# Patient Record
Sex: MALE | Race: WHITE | Employment: FULL TIME | URBAN - METROPOLITAN AREA
[De-identification: names, ages, dates, MRNs, and addresses within clinical notes are randomized per-mention and may not be internally consistent; named-entity substitution may affect disease eponyms.]

---

## 2018-07-25 ENCOUNTER — HOSPITAL ENCOUNTER (EMERGENCY)
Age: 28
Discharge: ANOTHER HOSPITAL | End: 2018-07-25
Attending: EMERGENCY MEDICINE
Payer: OTHER MISCELLANEOUS

## 2018-07-25 VITALS
HEART RATE: 68 BPM | TEMPERATURE: 97.9 F | OXYGEN SATURATION: 99 % | WEIGHT: 210 LBS | HEIGHT: 77 IN | SYSTOLIC BLOOD PRESSURE: 140 MMHG | DIASTOLIC BLOOD PRESSURE: 84 MMHG | RESPIRATION RATE: 14 BRPM | BODY MASS INDEX: 24.79 KG/M2

## 2018-07-25 DIAGNOSIS — T23.321A FULL THICKNESS BURN OF FINGER OF RIGHT HAND, INITIAL ENCOUNTER: Primary | ICD-10-CM

## 2018-07-25 PROCEDURE — 99283 EMERGENCY DEPT VISIT LOW MDM: CPT

## 2018-07-25 PROCEDURE — 90715 TDAP VACCINE 7 YRS/> IM: CPT | Performed by: EMERGENCY MEDICINE

## 2018-07-25 PROCEDURE — 90471 IMMUNIZATION ADMIN: CPT

## 2018-07-25 PROCEDURE — 74011250636 HC RX REV CODE- 250/636: Performed by: EMERGENCY MEDICINE

## 2018-07-25 RX ADMIN — TETANUS TOXOID, REDUCED DIPHTHERIA TOXOID AND ACELLULAR PERTUSSIS VACCINE, ADSORBED 0.5 ML: 5; 2.5; 8; 8; 2.5 SUSPENSION INTRAMUSCULAR at 10:31

## 2018-07-25 NOTE — ED PROVIDER NOTES
HPI Comments: 34 y.o. male with past medical history significant for vertigo, S/P tonsillectomy, and S/P tympanostomy who presents to the ED with chief complaint of burn. Pt reports he was using a push  this morning about an hour ago when his right hand got caught between the exhaust of the mower and a shelf and he received burns to his fingers. Pt now reports burns to the dorsal aspect of his right 2nd, 3rd, and 4th fingers with associated pain from the exhaust of the mower. Pt states he applied first aid cream and put a bandage over the area and came to the ED for evaluation. Pt states his last tetanus is unknown. There are no other acute medical complaints voiced at this time. Social Hx: Former smoker. Denies EtOH use. PCP: No primary care provider on file. Note written by Delfina Sol, as dictated by Josh Guy MD 10:05 AM     The history is provided by the patient and a relative. Past Medical History:   Diagnosis Date    Vertigo        Past Surgical History:   Procedure Laterality Date    HX TONSILLECTOMY      HX TYMPANOSTOMY           History reviewed. No pertinent family history. Social History     Social History    Marital status: SINGLE     Spouse name: N/A    Number of children: N/A    Years of education: N/A     Occupational History    Not on file. Social History Main Topics    Smoking status: Former Smoker    Smokeless tobacco: Never Used      Comment: quit 5/2018    Alcohol use No    Drug use: Not on file    Sexual activity: Not on file     Other Topics Concern    Not on file     Social History Narrative    No narrative on file         ALLERGIES: Review of patient's allergies indicates no known allergies. Review of Systems   Constitutional: Negative for chills and fever. HENT: Negative for ear pain and sore throat. Eyes: Negative for photophobia and pain. Respiratory: Negative for chest tightness and shortness of breath. Cardiovascular: Negative for chest pain and leg swelling. Gastrointestinal: Negative for abdominal pain, nausea and vomiting. Genitourinary: Negative for dysuria and flank pain. Musculoskeletal: Negative for back pain and neck pain. Skin: Negative for rash. +burns to dorsal aspect of right 2nd, 3rd, and 4th fingers with associated pain   Neurological: Negative for dizziness, light-headedness and headaches. All other systems reviewed and are negative. Vitals:    07/25/18 0953   BP: 147/89   Pulse: 65   Resp: 16   Temp: 98.8 °F (37.1 °C)   SpO2: 99%   Weight: 95.3 kg (210 lb)   Height: 6' 5\" (1.956 m)            Physical Exam   Musculoskeletal:        Right hand: He exhibits decreased range of motion. He exhibits normal capillary refill. Hands: The burns are white, non-tender and non-blanching  Decreased ROM of the DIP joints on fingers 2,3,4   Skin:   Full thickness burns to right hand   Nursing note and vitals reviewed.        MDM  Number of Diagnoses or Management Options  Full thickness burn of finger of right hand, initial encounter:   Diagnosis management comments: Full thickness burns to fingers - right hand - [facility] unit at [facility]  Give tetanus, may transfer by private car        ED Course       Procedures

## 2018-07-25 NOTE — ED TRIAGE NOTES
Pt has burn to 2nd,3rd, and 4th right fingers from  exhaust approx 1 hour ago. Sensation present to fingers.

## 2021-12-26 ENCOUNTER — HOSPITAL ENCOUNTER (EMERGENCY)
Age: 31
Discharge: HOME | End: 2021-12-26
Attending: EMERGENCY MEDICINE
Payer: COMMERCIAL

## 2021-12-26 ENCOUNTER — APPOINTMENT (OUTPATIENT)
Dept: GENERAL RADIOLOGY | Age: 31
End: 2021-12-26
Attending: EMERGENCY MEDICINE
Payer: COMMERCIAL

## 2021-12-26 VITALS
DIASTOLIC BLOOD PRESSURE: 75 MMHG | OXYGEN SATURATION: 96 % | WEIGHT: 225 LBS | RESPIRATION RATE: 16 BRPM | HEART RATE: 76 BPM | BODY MASS INDEX: 26.57 KG/M2 | SYSTOLIC BLOOD PRESSURE: 129 MMHG | TEMPERATURE: 98.6 F | HEIGHT: 77 IN

## 2021-12-26 DIAGNOSIS — S63.259A DISLOCATION OF FINGER, INITIAL ENCOUNTER: ICD-10-CM

## 2021-12-26 DIAGNOSIS — S62.657A NONDISPLACED FRACTURE OF MIDDLE PHALANX OF LEFT LITTLE FINGER, INITIAL ENCOUNTER FOR CLOSED FRACTURE: Primary | ICD-10-CM

## 2021-12-26 PROCEDURE — 74011250637 HC RX REV CODE- 250/637: Performed by: EMERGENCY MEDICINE

## 2021-12-26 PROCEDURE — 75810000301 HC ER LEVEL 1 CLOSED TREATMNT FRACTURE/DISLOCATION

## 2021-12-26 PROCEDURE — 99283 EMERGENCY DEPT VISIT LOW MDM: CPT

## 2021-12-26 PROCEDURE — 73140 X-RAY EXAM OF FINGER(S): CPT

## 2021-12-26 RX ORDER — HYDROCODONE BITARTRATE AND ACETAMINOPHEN 5; 325 MG/1; MG/1
1 TABLET ORAL
Status: COMPLETED | OUTPATIENT
Start: 2021-12-26 | End: 2021-12-26

## 2021-12-26 RX ADMIN — HYDROCODONE BITARTRATE AND ACETAMINOPHEN 1 TABLET: 5; 325 TABLET ORAL at 13:24

## 2021-12-26 NOTE — ED PROVIDER NOTES
Please note that this dictation was completed with [employer], the computer voice recognition software.  Quite often unanticipated grammatical, syntax, homophones, and other interpretive errors are inadvertently transcribed by the computer software.  Please disregard these errors.  Please excuse any errors that have escaped final proofreading. Patient is a 33-year-old male presenting to ED for evaluation of left pinky finger pain. He states that just prior to arrival he was playing flag football and fell onto his hand. He denies blow to head or loss of consciousness. Does not take blood thinners. Denies any additional medical complaints at this time. Past Medical History:   Diagnosis Date    Vertigo        Past Surgical History:   Procedure Laterality Date    HX TONSILLECTOMY      HX TYMPANOSTOMY           No family history on file. Social History     Socioeconomic History    Marital status: SINGLE     Spouse name: Not on file    Number of children: Not on file    Years of education: Not on file    Highest education level: Not on file   Occupational History    Not on file   Tobacco Use    Smoking status: Former Smoker    Smokeless tobacco: Never Used    Tobacco comment: quit 5/2018   Substance and Sexual Activity    Alcohol use: No    Drug use: Not on file    Sexual activity: Not on file   Other Topics Concern    Not on file   Social History Narrative    Not on file     Social Determinants of Health     Financial Resource Strain:     Difficulty of Paying Living Expenses: Not on file   Food Insecurity:     Worried About [address] in the Last Year: Not on file    Karolina of Food in the Last Year: Not on file   Transportation Needs:     Lack of Transportation (Medical): Not on file    Lack of Transportation (Non-Medical):  Not on file   Physical Activity:     Days of Exercise per Week: Not on file    Minutes of Exercise per Session: Not on file   Stress:     Feeling of Stress : Not on file   Social Connections:     Frequency of Communication with Friends and Family: Not on file    Frequency of Social Gatherings with Friends and Family: Not on file    Attends Druze Services: Not on file    Active Member of Clubs or Organizations: Not on file    Attends Club or Organization Meetings: Not on file    Marital Status: Not on file   Intimate Partner Violence:     Fear of Current or Ex-Partner: Not on file    Emotionally Abused: Not on file    Physically Abused: Not on file    Sexually Abused: Not on file   Housing Stability:     Unable to Pay for Housing in the Last Year: Not on file    Number of [place] in the Last Year: Not on file    Unstable Housing in the Last Year: Not on file         ALLERGIES: Patient has no known allergies. Review of Systems   Constitutional: Negative for chills and fever. HENT: Negative for ear pain and sore throat. Eyes: Negative for visual disturbance. Respiratory: Negative for cough and shortness of breath. Cardiovascular: Negative for chest pain. Gastrointestinal: Negative for abdominal pain. Genitourinary: Negative for flank pain. Musculoskeletal: Positive for arthralgias. Negative for back pain. Skin: Negative for color change. Neurological: Negative for dizziness and headaches. Psychiatric/Behavioral: Negative for confusion. Vitals:    12/26/21 1217   BP: 129/75   Pulse: 76   Resp: 16   Temp: 98.6 °F (37 °C)   SpO2: 96%   Weight: 102.1 kg (225 lb)   Height: 6' 5\" (1.956 m)            Physical Exam  Vitals and nursing note reviewed. Constitutional:       General: He is not in acute distress. Appearance: Normal appearance. He is not ill-appearing. HENT:      Head: Normocephalic and atraumatic. Jaw: There is normal jaw occlusion. Eyes:      General: Vision grossly intact. Extraocular Movements: Extraocular movements intact.       Conjunctiva/sclera: Conjunctivae normal.   Neck: Trachea: Phonation normal.   Cardiovascular:      Rate and Rhythm: Normal rate and regular rhythm. Heart sounds: Normal heart sounds. Pulmonary:      Effort: Pulmonary effort is normal.      Breath sounds: Normal breath sounds and air entry. Abdominal:      Palpations: Abdomen is soft. Tenderness: There is no abdominal tenderness. Musculoskeletal:      Left wrist: Normal.      Left hand: Deformity (Visible deformity to left fifth finger with decreased range of motion, no signs of open fracture. Distal gross sensation normal, normal cap refill.) and bony tenderness present. Decreased range of motion. Cervical back: Normal range of motion. Skin:     General: Skin is warm and dry. Neurological:      General: No focal deficit present. Mental Status: He is alert and oriented to person, place, and time. Psychiatric:         Attention and Perception: Attention normal.         Mood and Affect: Mood normal.         Speech: Speech normal.          MDM  Number of Diagnoses or Management Options  Dislocation of finger, initial encounter  Nondisplaced fracture of middle phalanx of left little finger, initial encounter for closed fracture  Diagnosis management comments: Patient is alert, afebrile, vitals stable. Presents with a deformity to the left fifth finger after a fall. No head injury. X-ray showing PIP dislocation which was reduced, repeat x-ray showing successful reduction and small avulsion fracture at the base of the middle phalanx. No clinical signs of neurovascular damage. Finger splint applied, patient to follow-up with Ortho with instructions to keep the finger splint on for the next 4 to 6 weeks. ED Course as of 12/26/21 1334   Sun Dec 26, 2021   1332 XR 5TH FINGER LT MIN 2 V    IMPRESSION  Satisfactory alignment following reduction of the dislocation. Tiny  avulsion fracture again noted base of the middle phalanx.  [EP]      ED Course User Index  [EP] PIOTR Acosta 1:37 PM  Pt has been reevaluated. There are no new complaints, changes, or physical findings at this time. All results have been reviewed with patient and/or family. Medications have been reviewed w/ pt and/or family. Pt and/or family's questions have been answered. Pt and/or family expressed good understanding of the dx/tx/rx and is in agreement with plan of care. Pt instructed and agreed to f/u w/ orthopedics and to return to ED upon further deterioration. Pt is ready for discharge. IMPRESSION:  1. Nondisplaced fracture of middle phalanx of left little finger, initial encounter for closed fracture    2. Dislocation of finger, initial encounter        PLAN:  1. There are no discharge medications for this patient. 2.   Follow-up Information     Follow up With Specialties Details Why Contact Info    Izzy Mae MD Orthopedic Surgery Schedule an appointment as soon as possible for a visit   [address]  [phone]              Return to ED if worse       Reduction of Joint    Date/Time: 12/26/2021 1:36 PM  Performed by: PIOTR Cates  Authorized by: PIOTR Cates     Consent:     Consent obtained:  Verbal    Consent given by:  Patient    Risks discussed:  Nerve damage and pain    Alternatives discussed:  No treatment  Injury:     Injury location:  Finger    Finger injury location:  R little finger    Finger fracture type: middle phalanx fracture      MCP joint involved: no      IP joint involved: yes    Pre-procedure assessment:     Neurological function: normal      Distal perfusion: normal      Range of motion: reduced    Anesthesia (see MAR for exact dosages):      Anesthesia method:  None (Patient offered digital block, declines)  Procedure details:     Manipulation performed: yes      Reduction successful: yes      X-ray confirmed reduction: yes      Immobilization:  Splint    Splint type:  Finger  Post-procedure details:     Neurological function: normal      Distal perfusion: normal      Range of motion: improved      Patient tolerance of procedure:   Tolerated well, no immediate complications

## 2021-12-26 NOTE — ED TRIAGE NOTES
Pt arrives ambulatory c/o left pinky injury playing flag football at 1115 today. Pt caught football then fell on hand. Pt has obvious deformity. Pt also has abrasion to right knee. Pt denies blood thinners or hitting head. Pt has no other significant medical history, is AOx4.

## 2021-12-26 NOTE — DISCHARGE INSTRUCTIONS
You dislocated and fractured your finger. The dislocation was fixed today, and you must follow-up with the orthopedist about the fracture. Wear the splint on your finger until you see the orthopedist, you usually need to wear this for about 6 weeks. Tylenol 500 mg alternating with Ibuprofen 600mg every 6-8 hours for pain, fever and/or body aches. Example: 8am take Ibuprofen. 11am take tylenol. 2pm take ibuprofen. 5pm take tylenol. 8pm take ibuprofen. 11pm take tylenol. You may continue this process overnight if you have continued pain/discomfort. Do not take over 3,000 mg of Tylenol in 24 hours.